# Patient Record
Sex: MALE | Race: BLACK OR AFRICAN AMERICAN | Employment: UNEMPLOYED | URBAN - METROPOLITAN AREA
[De-identification: names, ages, dates, MRNs, and addresses within clinical notes are randomized per-mention and may not be internally consistent; named-entity substitution may affect disease eponyms.]

---

## 2021-03-21 ENCOUNTER — HOSPITAL ENCOUNTER (EMERGENCY)
Facility: CLINIC | Age: 41
Discharge: HOME | End: 2021-03-21
Attending: EMERGENCY MEDICINE | Admitting: EMERGENCY MEDICINE

## 2021-03-21 VITALS
RESPIRATION RATE: 18 BRPM | SYSTOLIC BLOOD PRESSURE: 158 MMHG | HEART RATE: 98 BPM | DIASTOLIC BLOOD PRESSURE: 95 MMHG | TEMPERATURE: 97.7 F | OXYGEN SATURATION: 99 %

## 2021-03-21 DIAGNOSIS — F29 PSYCHOSIS, UNSPECIFIED PSYCHOSIS TYPE (H): ICD-10-CM

## 2021-03-21 PROCEDURE — 250N000013 HC RX MED GY IP 250 OP 250 PS 637: Performed by: EMERGENCY MEDICINE

## 2021-03-21 PROCEDURE — 90791 PSYCH DIAGNOSTIC EVALUATION: CPT

## 2021-03-21 PROCEDURE — 99285 EMERGENCY DEPT VISIT HI MDM: CPT | Mod: 25 | Performed by: EMERGENCY MEDICINE

## 2021-03-21 PROCEDURE — 99284 EMERGENCY DEPT VISIT MOD MDM: CPT | Performed by: EMERGENCY MEDICINE

## 2021-03-21 RX ORDER — QUETIAPINE FUMARATE 25 MG/1
50 TABLET, FILM COATED ORAL ONCE
Status: COMPLETED | OUTPATIENT
Start: 2021-03-21 | End: 2021-03-21

## 2021-03-21 RX ORDER — QUETIAPINE FUMARATE 50 MG/1
50 TABLET, FILM COATED ORAL 2 TIMES DAILY
Qty: 42 TABLET | Refills: 0 | Status: SHIPPED | OUTPATIENT
Start: 2021-03-21

## 2021-03-21 RX ORDER — OLANZAPINE 5 MG/1
5 TABLET, ORALLY DISINTEGRATING ORAL ONCE
Status: COMPLETED | OUTPATIENT
Start: 2021-03-21 | End: 2021-03-21

## 2021-03-21 RX ADMIN — OLANZAPINE 5 MG: 5 TABLET, ORALLY DISINTEGRATING ORAL at 12:05

## 2021-03-21 RX ADMIN — QUETIAPINE FUMARATE 50 MG: 25 TABLET ORAL at 15:02

## 2021-03-21 ASSESSMENT — ENCOUNTER SYMPTOMS: HALLUCINATIONS: 1

## 2021-03-21 NOTE — ED NOTES
Bed: ED11  Expected date: 3/21/21  Expected time: 11:05 AM  Means of arrival:   Comments:  [address] 42yo M si

## 2021-03-21 NOTE — ED TRIAGE NOTES
Per EMS, pt having auditory hallucinations. Has also been depressed recently due to a break up with his girlfriend who is now dating his best friend. Interested in resources to help him.
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
